# Patient Record
Sex: MALE | Race: WHITE | NOT HISPANIC OR LATINO | Employment: OTHER | URBAN - NONMETROPOLITAN AREA
[De-identification: names, ages, dates, MRNs, and addresses within clinical notes are randomized per-mention and may not be internally consistent; named-entity substitution may affect disease eponyms.]

---

## 2021-12-04 ENCOUNTER — HOSPITAL ENCOUNTER (EMERGENCY)
Facility: HOSPITAL | Age: 56
Discharge: HOME | End: 2021-12-04
Attending: EMERGENCY MEDICINE | Admitting: EMERGENCY MEDICINE

## 2021-12-04 ENCOUNTER — APPOINTMENT (OUTPATIENT)
Dept: ULTRASOUND IMAGING | Facility: HOSPITAL | Age: 56
End: 2021-12-04

## 2021-12-04 ENCOUNTER — APPOINTMENT (OUTPATIENT)
Dept: GENERAL RADIOLOGY | Facility: HOSPITAL | Age: 56
End: 2021-12-04

## 2021-12-04 VITALS
RESPIRATION RATE: 18 BRPM | HEART RATE: 69 BPM | WEIGHT: 164 LBS | DIASTOLIC BLOOD PRESSURE: 92 MMHG | BODY MASS INDEX: 27.32 KG/M2 | HEIGHT: 65 IN | SYSTOLIC BLOOD PRESSURE: 155 MMHG | OXYGEN SATURATION: 97 % | TEMPERATURE: 98.3 F

## 2021-12-04 DIAGNOSIS — M71.22 SYNOVIAL CYST OF LEFT POPLITEAL SPACE: Primary | ICD-10-CM

## 2021-12-04 LAB
ALBUMIN SERPL-MCNC: 4.24 G/DL (ref 3.5–5.2)
ALBUMIN/GLOB SERPL: 1.7 G/DL
ALP SERPL-CCNC: 75 U/L (ref 39–117)
ALT SERPL W P-5'-P-CCNC: 49 U/L (ref 1–41)
ANION GAP SERPL CALCULATED.3IONS-SCNC: 12.1 MMOL/L (ref 5–15)
AST SERPL-CCNC: 43 U/L (ref 1–40)
BASOPHILS # BLD AUTO: 0.09 10*3/MM3 (ref 0–0.2)
BASOPHILS NFR BLD AUTO: 1.2 % (ref 0–1.5)
BILIRUB SERPL-MCNC: 0.6 MG/DL (ref 0–1.2)
BUN SERPL-MCNC: 17 MG/DL (ref 6–20)
BUN/CREAT SERPL: 12.5 (ref 7–25)
CALCIUM SPEC-SCNC: 9.3 MG/DL (ref 8.6–10.5)
CHLORIDE SERPL-SCNC: 103 MMOL/L (ref 98–107)
CO2 SERPL-SCNC: 26.9 MMOL/L (ref 22–29)
CREAT SERPL-MCNC: 1.36 MG/DL (ref 0.76–1.27)
CRP SERPL-MCNC: 0.3 MG/DL (ref 0–0.5)
DEPRECATED RDW RBC AUTO: 43.8 FL (ref 37–54)
EOSINOPHIL # BLD AUTO: 0.3 10*3/MM3 (ref 0–0.4)
EOSINOPHIL NFR BLD AUTO: 4.1 % (ref 0.3–6.2)
ERYTHROCYTE [DISTWIDTH] IN BLOOD BY AUTOMATED COUNT: 13.3 % (ref 12.3–15.4)
ERYTHROCYTE [SEDIMENTATION RATE] IN BLOOD: 3 MM/HR (ref 0–20)
GFR SERPL CREATININE-BSD FRML MDRD: 55 ML/MIN/1.73
GLOBULIN UR ELPH-MCNC: 2.6 GM/DL
GLUCOSE SERPL-MCNC: 138 MG/DL (ref 65–99)
HCT VFR BLD AUTO: 44.4 % (ref 37.5–51)
HGB BLD-MCNC: 14.7 G/DL (ref 13–17.7)
IMM GRANULOCYTES # BLD AUTO: 0.02 10*3/MM3 (ref 0–0.05)
IMM GRANULOCYTES NFR BLD AUTO: 0.3 % (ref 0–0.5)
LYMPHOCYTES # BLD AUTO: 2.51 10*3/MM3 (ref 0.7–3.1)
LYMPHOCYTES NFR BLD AUTO: 34.3 % (ref 19.6–45.3)
MCH RBC QN AUTO: 29.8 PG (ref 26.6–33)
MCHC RBC AUTO-ENTMCNC: 33.1 G/DL (ref 31.5–35.7)
MCV RBC AUTO: 90.1 FL (ref 79–97)
MONOCYTES # BLD AUTO: 0.6 10*3/MM3 (ref 0.1–0.9)
MONOCYTES NFR BLD AUTO: 8.2 % (ref 5–12)
NEUTROPHILS NFR BLD AUTO: 3.8 10*3/MM3 (ref 1.7–7)
NEUTROPHILS NFR BLD AUTO: 51.9 % (ref 42.7–76)
NRBC BLD AUTO-RTO: 0 /100 WBC (ref 0–0.2)
PLATELET # BLD AUTO: 232 10*3/MM3 (ref 140–450)
PMV BLD AUTO: 10.7 FL (ref 6–12)
POTASSIUM SERPL-SCNC: 3.6 MMOL/L (ref 3.5–5.2)
PROT SERPL-MCNC: 6.8 G/DL (ref 6–8.5)
RBC # BLD AUTO: 4.93 10*6/MM3 (ref 4.14–5.8)
SODIUM SERPL-SCNC: 142 MMOL/L (ref 136–145)
WBC NRBC COR # BLD: 7.32 10*3/MM3 (ref 3.4–10.8)

## 2021-12-04 PROCEDURE — 80053 COMPREHEN METABOLIC PANEL: CPT | Performed by: PHYSICIAN ASSISTANT

## 2021-12-04 PROCEDURE — 93971 EXTREMITY STUDY: CPT

## 2021-12-04 PROCEDURE — 86140 C-REACTIVE PROTEIN: CPT | Performed by: PHYSICIAN ASSISTANT

## 2021-12-04 PROCEDURE — 85652 RBC SED RATE AUTOMATED: CPT | Performed by: PHYSICIAN ASSISTANT

## 2021-12-04 PROCEDURE — 73562 X-RAY EXAM OF KNEE 3: CPT

## 2021-12-04 PROCEDURE — 99283 EMERGENCY DEPT VISIT LOW MDM: CPT

## 2021-12-04 PROCEDURE — 85025 COMPLETE CBC W/AUTO DIFF WBC: CPT | Performed by: PHYSICIAN ASSISTANT

## 2021-12-04 NOTE — ED NOTES
MEDICAL SCREENING:    Reason for Visit: left leg swelling and bruising      Patient initially seen in triage.  The patient was advised further evaluation and diagnostic testing will be needed, some of the treatment and testing will be initiated in the lobby in order to begin the process.  The patient will be returned to the waiting area for the time being and possibly be re-assessed by a subsequent ED provider.  The patient will be brought back to the treatment area in as timely manner as possible.         lEen Luna PA-C  12/04/21 [id number]

## 2021-12-05 NOTE — ED PROVIDER NOTES
"Subjective   55 yo male patient presents to the ED with complaints of left knee and lower leg pain since Tuesday (5 days). Patient states that a \"knot\" came up on his knee while coming home from [place] on Tuesday from a fishing trip. Pt states that he developed some pain and felt like the \"knot popped.\" He denies any injury. Patient states that he went to [facility] and was told that he had an injury. Patient states that he does not recall an injury. Patient states that he is still having some pain. He also reports bruising down his left lower leg. No swelling.  Patient states he is taking ibuprofen for pain.        History provided by:  Patient   used: No        Review of Systems   Constitutional: Negative.    HENT: Negative.    Eyes: Negative.    Respiratory: Negative.    Cardiovascular: Negative.    Gastrointestinal: Negative.    Endocrine: Negative.    Genitourinary: Negative.    Musculoskeletal:        Left knee pain     Skin: Negative.    Allergic/Immunologic: Negative.    Neurological: Negative.    Hematological: Negative.    Psychiatric/Behavioral: Negative.    All other systems reviewed and are negative.      No past medical history on file.    No Known Allergies    No past surgical history on file.    No family history on file.    Social History     Socioeconomic History   • Marital status:            Objective   Physical Exam  Vitals and nursing note reviewed.   Constitutional:       Appearance: Normal appearance. He is normal weight.   HENT:      Head: Normocephalic and atraumatic.      Right Ear: External ear normal.      Left Ear: External ear normal.      Nose: Nose normal.      Mouth/Throat:      Mouth: Mucous membranes are moist.      Pharynx: Oropharynx is clear.   Eyes:      Extraocular Movements: Extraocular movements intact.      Conjunctiva/sclera: Conjunctivae normal.      Pupils: Pupils are equal, round, and reactive to light.   Cardiovascular:      Rate and " Rhythm: Normal rate and regular rhythm.      Pulses: Normal pulses.      Heart sounds: Normal heart sounds.   Pulmonary:      Effort: Pulmonary effort is normal.      Breath sounds: Normal breath sounds.   Abdominal:      General: Abdomen is flat. Bowel sounds are normal.      Palpations: Abdomen is soft.   Musculoskeletal:         General: Normal range of motion.      Cervical back: Normal range of motion and neck supple.        Legs:    Skin:     General: Skin is warm.      Capillary Refill: Capillary refill takes less than 2 seconds.   Neurological:      General: No focal deficit present.      Mental Status: He is alert and oriented to person, place, and time. Mental status is at baseline.   Psychiatric:         Mood and Affect: Mood normal.         Behavior: Behavior normal.         Thought Content: Thought content normal.         Judgment: Judgment normal.         Procedures           ED Course  ED Course as of 12/04/21 2218   Sat Dec 04, 2021   1909 US Venous Doppler Lower Extremity Left (duplex)  IMPRESSION:  No deep venous thrombus in the left lower extremity.      Signer Name: Hubert Hewitt MD   Signed: 12/4/2021 6:27 PM   Workstation Name: [facility]    [facility]          Specimen Collected: 12/04/21 18:27         [ML]   2008 XR Knee 3 View Left  IMPRESSION:  There is a faintly calcified lobular density posterior to the knee joint possibly representing calcification in a popliteal cyst measuring 2 x 2.5 cm. Also noted is mild degenerative change in the medial compartment.     Signer Name: Hubert Hewitt MD   Signed: 12/4/2021 7:53 PM   [facility]          Specimen Collected: 12/04/21 19:53 Last Resulted: 12/04/21 19:53           [ML]   2030 Pt instructed to f/u with PCP and orthopedic. Discussed sx that would warrant return to the ED.   [ML]      ED Course User Index  [ML] Cecilia Banegas PA                                                  MDM  Number of Diagnoses or Management Options     Amount and/or Complexity of Data Reviewed  Clinical lab tests: ordered and reviewed  Tests in the radiology section of CPT®: ordered and reviewed        Final diagnoses:   Synovial cyst of left popliteal space       ED Disposition  ED Disposition     ED Disposition Condition Comment    Discharge Stable           Jalil Awan MD  [address] [place] [address] [address] [place] [address]  [phone]    Schedule an appointment as soon as possible for a visit in 1 day           Medication List      No changes were made to your prescriptions during this visit.          Cecilia Banegas PA  12/04/21 2213       Cecilia Banegas PA  12/04/21 2216

## 2022-02-21 ENCOUNTER — OFFICE VISIT (OUTPATIENT)
Dept: ENDOCRINOLOGY | Facility: CLINIC | Age: 57
End: 2022-02-21

## 2022-02-21 ENCOUNTER — LAB (OUTPATIENT)
Dept: LAB | Facility: HOSPITAL | Age: 57
End: 2022-02-21

## 2022-02-21 VITALS
WEIGHT: 166 LBS | HEIGHT: 64 IN | BODY MASS INDEX: 28.34 KG/M2 | DIASTOLIC BLOOD PRESSURE: 85 MMHG | HEART RATE: 60 BPM | TEMPERATURE: 97.1 F | SYSTOLIC BLOOD PRESSURE: 122 MMHG | OXYGEN SATURATION: 95 %

## 2022-02-21 DIAGNOSIS — E04.2 MULTIPLE THYROID NODULES: ICD-10-CM

## 2022-02-21 DIAGNOSIS — R94.6 ABNORMAL THYROID FUNCTION TEST: Primary | ICD-10-CM

## 2022-02-21 PROCEDURE — 36415 COLL VENOUS BLD VENIPUNCTURE: CPT | Performed by: NURSE PRACTITIONER

## 2022-02-21 PROCEDURE — 99203 OFFICE O/P NEW LOW 30 MIN: CPT | Performed by: NURSE PRACTITIONER

## 2022-02-21 PROCEDURE — 84439 ASSAY OF FREE THYROXINE: CPT | Performed by: NURSE PRACTITIONER

## 2022-02-21 PROCEDURE — 84480 ASSAY TRIIODOTHYRONINE (T3): CPT | Performed by: NURSE PRACTITIONER

## 2022-02-21 PROCEDURE — 84481 FREE ASSAY (FT-3): CPT | Performed by: NURSE PRACTITIONER

## 2022-02-21 PROCEDURE — 84443 ASSAY THYROID STIM HORMONE: CPT | Performed by: NURSE PRACTITIONER

## 2022-02-21 NOTE — PROGRESS NOTES
Chief Complaint   Patient presents with   • Thyroid Problem     initial encounter        Referring Provider  Johana Leonard APRN HPI   Ken Orosco is a 56 y.o. male had concerns including Thyroid Problem (initial encounter).   He has had some increased SOA, has had some difficulty swallowing food and has his feet stay cold all the time.  He went to his PCP and they did some labs.  He also had an US Thyroid.  Labs:  8/13/2021:   TSH: 1.91 (0.27-4.20)  T4: 13.00 H (4.50-11.70)  Free T4: 1.27 (0.86-1.76)  T3: 3.15 H (0.80-2.00)  Free T3: 5.56 H (2.30-4.40)  12/16/2021:  TSH: 1.94 (0.27-4.20)  T4: 11.60 (4.50-11.70)  Free T4: 1.15 (0.86-1.76)  T3: 2.80 H (0.80-2.00)  Free T3: 5.25 H (2.30-4.40)  US Thyroid 09/20/2021:  Right lobe with 2-8mm TI-RADS 4 nodules.    Birth state: [place]  Previous history of radiation to face/neck: none  Consuming foods high in iodine: none  Family history of thyroid complications: sister-Graves' disease.    The following portions of the patient's history were reviewed and updated as appropriate: allergies, current medications, past family history, past medical history, past social history, past surgical history and problem list.    History reviewed. No pertinent past medical history.  Past Surgical History:   Procedure Laterality Date   • APPENDECTOMY  2002      Family History   Problem Relation Age of Onset   • ALS Mother    • Graves' disease Sister       Social History     Socioeconomic History   • Marital status:    Tobacco Use   • Smoking status: Never Smoker   • Smokeless tobacco: Never Used   Vaping Use   • Vaping Use: Never used   Substance and Sexual Activity   • Alcohol use: Never   • Drug use: Never   • Sexual activity: Defer      No Known Allergies   No current outpatient medications on file prior to visit.     No current facility-administered medications on file prior to visit.     Review of Systems   Constitutional: Positive for fatigue. Negative for appetite change,  "unexpected weight gain and unexpected weight loss.   Eyes: Positive for blurred vision.   Respiratory: Positive for shortness of breath.    Cardiovascular: Positive for palpitations.   Gastrointestinal: Negative.    Endocrine: Positive for cold intolerance, polydipsia, polyphagia and polyuria. Negative for heat intolerance.   Skin: Positive for dry skin.   Neurological: Negative for tremors.   Psychiatric/Behavioral: Positive for agitation. Negative for sleep disturbance. The patient is nervous/anxious.       /85 (BP Location: Left arm, Patient Position: Sitting, Cuff Size: Adult)   Pulse 60   Temp 97.1 °F (36.2 °C) (Oral)   Ht 162.6 cm (64\")   Wt 75.3 kg (166 lb)   SpO2 95%   BMI 28.49 kg/m²      Physical Exam  Vitals reviewed.   Constitutional:       Appearance: Normal appearance.   Eyes:      Extraocular Movements: Extraocular movements intact.   Neck:      Thyroid: No thyroid mass, thyromegaly or thyroid tenderness.      Comments: No palpable nodules  Cardiovascular:      Rate and Rhythm: Normal rate and regular rhythm.      Pulses: Normal pulses.      Heart sounds: Normal heart sounds.   Pulmonary:      Effort: Pulmonary effort is normal.      Breath sounds: Normal breath sounds.   Skin:     General: Skin is warm.   Neurological:      Mental Status: He is alert and oriented to person, place, and time.   Psychiatric:         Mood and Affect: Mood normal.         Behavior: Behavior normal.         Thought Content: Thought content normal.         Judgment: Judgment normal.       Labs/Imaging  CMP  Lab Results   Component Value Date    GLUCOSE 138 (H) 12/04/2021    BUN 17 12/04/2021    CREATININE 1.36 (H) 12/04/2021    EGFRIFNONA 55 (L) 12/04/2021    BCR 12.5 12/04/2021    K 3.6 12/04/2021    CO2 26.9 12/04/2021    CALCIUM 9.3 12/04/2021    ALBUMIN 4.24 12/04/2021    AST 43 (H) 12/04/2021    ALT 49 (H) 12/04/2021        CBC w/DIFF   Lab Results   Component Value Date    WBC 7.32 12/04/2021    RBC 4.93 " 12/04/2021    HGB 14.7 12/04/2021    HCT 44.4 12/04/2021    MCV 90.1 12/04/2021    MCH 29.8 12/04/2021    MCHC 33.1 12/04/2021    RDW 13.3 12/04/2021    RDWSD 43.8 12/04/2021    MPV 10.7 12/04/2021     12/04/2021    NEUTRORELPCT 51.9 12/04/2021    LYMPHORELPCT 34.3 12/04/2021    MONORELPCT 8.2 12/04/2021    EOSRELPCT 4.1 12/04/2021    BASORELPCT 1.2 12/04/2021    AUTOIGPER 0.3 12/04/2021    NEUTROABS 3.80 12/04/2021    LYMPHSABS 2.51 12/04/2021    MONOSABS 0.60 12/04/2021    EOSABS 0.30 12/04/2021    BASOSABS 0.09 12/04/2021    AUTOIGNUM 0.02 12/04/2021    NRBC 0.0 12/04/2021       TSH  No results found for: TSH    T4  No results found for: FREET4  No results found for: H4TOHEX    T3  No results found for: T3FREE  No results found for: T4KGHCP    TRAb  No results found for: TSURCPAB    TPO  No results found for: THYROIDAB    No valid procedures specified.    Assessment and Plan    Diagnoses and all orders for this visit:    1. Abnormal thyroid function test (Primary)  Assessment & Plan:  -Clinically euthyroid.  -Discussed with patient and wife the labs.  He needs further labs to help determine thyroid issue.  Labs to be done today.  -Follow-up in 3 months.    Orders:  -     TSH  -     T4, Free  -     T3, Free  -     T3  -     T4    2. Multiple thyroid nodules  Assessment & Plan:  -US thyroid to be repeated in March 2022, as this will be 6 months after most recent report.    Orders:  -     US Thyroid; Future       Return in about 3 months (around 5/21/2022) for Follow-up appointment. The patient was instructed to contact the clinic with any interval questions or concerns.    MANAS Jorgensen   Endocrinology

## 2022-02-21 NOTE — ASSESSMENT & PLAN NOTE
-Clinically euthyroid.  -Discussed with patient and wife the labs.  He needs further labs to help determine thyroid issue.  Labs to be done today.  -Follow-up in 3 months.

## 2022-02-22 ENCOUNTER — TELEPHONE (OUTPATIENT)
Dept: ENDOCRINOLOGY | Facility: CLINIC | Age: 57
End: 2022-02-22

## 2022-02-22 LAB
T3 SERPL-MCNC: 248 NG/DL (ref 80–200)
T3FREE SERPL-MCNC: 4.41 PG/ML (ref 2–4.4)
T4 FREE SERPL-MCNC: 1.03 NG/DL (ref 0.93–1.7)
T4 SERPL-MCNC: 11.2 MCG/DL (ref 4.5–11.7)
TSH SERPL DL<=0.05 MIU/L-ACNC: 1.62 UIU/ML (ref 0.27–4.2)

## 2022-02-22 NOTE — TELEPHONE ENCOUNTER
I called patient per MANAS Jorgensen to inform him that his thyroid labs are still abnormal, although they are improving. Shaila wants him to get labs drawn again at next office visit so that we can continue to monitor his thyroid levels. Patient understood and was appreciative of my call.

## 2022-02-23 ENCOUNTER — PATIENT ROUNDING (BHMG ONLY) (OUTPATIENT)
Dept: ENDOCRINOLOGY | Facility: CLINIC | Age: 57
End: 2022-02-23

## 2022-02-23 NOTE — PROGRESS NOTES
A [facility] message has been sent to the patient for patient [place] [facility] [place]

## 2022-03-16 ENCOUNTER — APPOINTMENT (OUTPATIENT)
Dept: ULTRASOUND IMAGING | Facility: HOSPITAL | Age: 57
End: 2022-03-16

## 2025-01-29 ENCOUNTER — APPOINTMENT (OUTPATIENT)
Dept: MRI IMAGING | Facility: HOSPITAL | Age: 60
End: 2025-01-29
Payer: COMMERCIAL

## 2025-01-29 ENCOUNTER — APPOINTMENT (OUTPATIENT)
Dept: CT IMAGING | Facility: HOSPITAL | Age: 60
End: 2025-01-29
Payer: COMMERCIAL

## 2025-01-29 ENCOUNTER — HOSPITAL ENCOUNTER (EMERGENCY)
Facility: HOSPITAL | Age: 60
Discharge: HOME | End: 2025-01-29
Attending: STUDENT IN AN ORGANIZED HEALTH CARE EDUCATION/TRAINING PROGRAM | Admitting: STUDENT IN AN ORGANIZED HEALTH CARE EDUCATION/TRAINING PROGRAM
Payer: COMMERCIAL

## 2025-01-29 ENCOUNTER — APPOINTMENT (OUTPATIENT)
Dept: GENERAL RADIOLOGY | Facility: HOSPITAL | Age: 60
End: 2025-01-29
Payer: COMMERCIAL

## 2025-01-29 VITALS
BODY MASS INDEX: 26.49 KG/M2 | RESPIRATION RATE: 20 BRPM | WEIGHT: 159 LBS | DIASTOLIC BLOOD PRESSURE: 100 MMHG | OXYGEN SATURATION: 98 % | TEMPERATURE: 98 F | SYSTOLIC BLOOD PRESSURE: 145 MMHG | HEART RATE: 76 BPM | HEIGHT: 65 IN

## 2025-01-29 DIAGNOSIS — R07.9 CHEST PAIN, UNSPECIFIED TYPE: Primary | ICD-10-CM

## 2025-01-29 DIAGNOSIS — R53.1 RIGHT SIDED WEAKNESS: ICD-10-CM

## 2025-01-29 LAB
ABO GROUP BLD: NORMAL
ABO GROUP BLD: NORMAL
ALBUMIN SERPL-MCNC: 4.6 G/DL (ref 3.5–5.2)
ALBUMIN/GLOB SERPL: 1.4 G/DL
ALP SERPL-CCNC: 73 U/L (ref 39–117)
ALT SERPL W P-5'-P-CCNC: 36 U/L (ref 1–41)
ANION GAP SERPL CALCULATED.3IONS-SCNC: 17.1 MMOL/L (ref 5–15)
AST SERPL-CCNC: 34 U/L (ref 1–40)
BASOPHILS # BLD AUTO: 0.11 10*3/MM3 (ref 0–0.2)
BASOPHILS NFR BLD AUTO: 1 % (ref 0–1.5)
BILIRUB SERPL-MCNC: 0.7 MG/DL (ref 0–1.2)
BLD GP AB SCN SERPL QL: NEGATIVE
BUN SERPL-MCNC: 23 MG/DL (ref 6–20)
BUN/CREAT SERPL: 23 (ref 7–25)
CALCIUM SPEC-SCNC: 9.7 MG/DL (ref 8.6–10.5)
CHLORIDE SERPL-SCNC: 102 MMOL/L (ref 98–107)
CO2 SERPL-SCNC: 20.9 MMOL/L (ref 22–29)
CREAT BLDA-MCNC: 1.2 MG/DL (ref 0.6–1.3)
CREAT SERPL-MCNC: 1 MG/DL (ref 0.76–1.27)
DEPRECATED RDW RBC AUTO: 44.1 FL (ref 37–54)
EGFRCR SERPLBLD CKD-EPI 2021: 87.8 ML/MIN/1.73
EOSINOPHIL # BLD AUTO: 0.24 10*3/MM3 (ref 0–0.4)
EOSINOPHIL NFR BLD AUTO: 2.3 % (ref 0.3–6.2)
ERYTHROCYTE [DISTWIDTH] IN BLOOD BY AUTOMATED COUNT: 13.5 % (ref 12.3–15.4)
GEN 5 1HR TROPONIN T REFLEX: 8 NG/L
GLOBULIN UR ELPH-MCNC: 3.2 GM/DL
GLUCOSE BLDC GLUCOMTR-MCNC: 132 MG/DL (ref 70–130)
GLUCOSE SERPL-MCNC: 179 MG/DL (ref 65–99)
HCT VFR BLD AUTO: 49 % (ref 37.5–51)
HGB BLD-MCNC: 16.2 G/DL (ref 13–17.7)
HOLD SPECIMEN: NORMAL
HOLD SPECIMEN: NORMAL
IMM GRANULOCYTES # BLD AUTO: 0.03 10*3/MM3 (ref 0–0.05)
IMM GRANULOCYTES NFR BLD AUTO: 0.3 % (ref 0–0.5)
INR PPP: 0.96 (ref 0.9–1.1)
LYMPHOCYTES # BLD AUTO: 5.22 10*3/MM3 (ref 0.7–3.1)
LYMPHOCYTES NFR BLD AUTO: 49 % (ref 19.6–45.3)
MCH RBC QN AUTO: 29.3 PG (ref 26.6–33)
MCHC RBC AUTO-ENTMCNC: 33.1 G/DL (ref 31.5–35.7)
MCV RBC AUTO: 88.6 FL (ref 79–97)
MONOCYTES # BLD AUTO: 0.69 10*3/MM3 (ref 0.1–0.9)
MONOCYTES NFR BLD AUTO: 6.5 % (ref 5–12)
NEUTROPHILS NFR BLD AUTO: 4.37 10*3/MM3 (ref 1.7–7)
NEUTROPHILS NFR BLD AUTO: 40.9 % (ref 42.7–76)
NRBC BLD AUTO-RTO: 0 /100 WBC (ref 0–0.2)
PLATELET # BLD AUTO: 266 10*3/MM3 (ref 140–450)
PMV BLD AUTO: 10.5 FL (ref 6–12)
POTASSIUM SERPL-SCNC: 3.5 MMOL/L (ref 3.5–5.2)
PROT SERPL-MCNC: 7.8 G/DL (ref 6–8.5)
PROTHROMBIN TIME: 12.9 SECONDS (ref 11.6–15.1)
RBC # BLD AUTO: 5.53 10*6/MM3 (ref 4.14–5.8)
RH BLD: POSITIVE
RH BLD: POSITIVE
SODIUM SERPL-SCNC: 140 MMOL/L (ref 136–145)
T&S EXPIRATION DATE: NORMAL
TROPONIN T NUMERIC DELTA: 0 NG/L
TROPONIN T SERPL HS-MCNC: 8 NG/L
WBC NRBC COR # BLD AUTO: 10.66 10*3/MM3 (ref 3.4–10.8)
WHOLE BLOOD HOLD COAG: NORMAL
WHOLE BLOOD HOLD SPECIMEN: NORMAL

## 2025-01-29 PROCEDURE — 25510000001 IOPAMIDOL PER 1 ML: Performed by: STUDENT IN AN ORGANIZED HEALTH CARE EDUCATION/TRAINING PROGRAM

## 2025-01-29 PROCEDURE — 93005 ELECTROCARDIOGRAM TRACING: CPT | Performed by: STUDENT IN AN ORGANIZED HEALTH CARE EDUCATION/TRAINING PROGRAM

## 2025-01-29 PROCEDURE — 99285 EMERGENCY DEPT VISIT HI MDM: CPT

## 2025-01-29 PROCEDURE — 86850 RBC ANTIBODY SCREEN: CPT | Performed by: STUDENT IN AN ORGANIZED HEALTH CARE EDUCATION/TRAINING PROGRAM

## 2025-01-29 PROCEDURE — 71045 X-RAY EXAM CHEST 1 VIEW: CPT | Performed by: RADIOLOGY

## 2025-01-29 PROCEDURE — 70498 CT ANGIOGRAPHY NECK: CPT

## 2025-01-29 PROCEDURE — 80053 COMPREHEN METABOLIC PANEL: CPT | Performed by: STUDENT IN AN ORGANIZED HEALTH CARE EDUCATION/TRAINING PROGRAM

## 2025-01-29 PROCEDURE — 86901 BLOOD TYPING SEROLOGIC RH(D): CPT

## 2025-01-29 PROCEDURE — 85610 PROTHROMBIN TIME: CPT | Performed by: STUDENT IN AN ORGANIZED HEALTH CARE EDUCATION/TRAINING PROGRAM

## 2025-01-29 PROCEDURE — 36415 COLL VENOUS BLD VENIPUNCTURE: CPT | Performed by: STUDENT IN AN ORGANIZED HEALTH CARE EDUCATION/TRAINING PROGRAM

## 2025-01-29 PROCEDURE — 86900 BLOOD TYPING SEROLOGIC ABO: CPT

## 2025-01-29 PROCEDURE — 70553 MRI BRAIN STEM W/O & W/DYE: CPT

## 2025-01-29 PROCEDURE — 70498 CT ANGIOGRAPHY NECK: CPT | Performed by: RADIOLOGY

## 2025-01-29 PROCEDURE — 86901 BLOOD TYPING SEROLOGIC RH(D): CPT | Performed by: STUDENT IN AN ORGANIZED HEALTH CARE EDUCATION/TRAINING PROGRAM

## 2025-01-29 PROCEDURE — 70496 CT ANGIOGRAPHY HEAD: CPT

## 2025-01-29 PROCEDURE — 25510000002 GADOBENATE DIMEGLUMINE 529 MG/ML SOLUTION: Performed by: STUDENT IN AN ORGANIZED HEALTH CARE EDUCATION/TRAINING PROGRAM

## 2025-01-29 PROCEDURE — 70450 CT HEAD/BRAIN W/O DYE: CPT

## 2025-01-29 PROCEDURE — 93010 ELECTROCARDIOGRAM REPORT: CPT | Performed by: INTERNAL MEDICINE

## 2025-01-29 PROCEDURE — A9577 INJ MULTIHANCE: HCPCS | Performed by: STUDENT IN AN ORGANIZED HEALTH CARE EDUCATION/TRAINING PROGRAM

## 2025-01-29 PROCEDURE — 84484 ASSAY OF TROPONIN QUANT: CPT | Performed by: STUDENT IN AN ORGANIZED HEALTH CARE EDUCATION/TRAINING PROGRAM

## 2025-01-29 PROCEDURE — 82948 REAGENT STRIP/BLOOD GLUCOSE: CPT

## 2025-01-29 PROCEDURE — 71045 X-RAY EXAM CHEST 1 VIEW: CPT

## 2025-01-29 PROCEDURE — 82565 ASSAY OF CREATININE: CPT

## 2025-01-29 PROCEDURE — 99204 OFFICE O/P NEW MOD 45 MIN: CPT | Performed by: PSYCHIATRY & NEUROLOGY

## 2025-01-29 PROCEDURE — 70496 CT ANGIOGRAPHY HEAD: CPT | Performed by: RADIOLOGY

## 2025-01-29 PROCEDURE — 85025 COMPLETE CBC W/AUTO DIFF WBC: CPT | Performed by: STUDENT IN AN ORGANIZED HEALTH CARE EDUCATION/TRAINING PROGRAM

## 2025-01-29 PROCEDURE — 70553 MRI BRAIN STEM W/O & W/DYE: CPT | Performed by: RADIOLOGY

## 2025-01-29 PROCEDURE — 86900 BLOOD TYPING SEROLOGIC ABO: CPT | Performed by: STUDENT IN AN ORGANIZED HEALTH CARE EDUCATION/TRAINING PROGRAM

## 2025-01-29 RX ORDER — IOPAMIDOL 755 MG/ML
100 INJECTION, SOLUTION INTRAVASCULAR
Status: COMPLETED | OUTPATIENT
Start: 2025-01-29 | End: 2025-01-29

## 2025-01-29 RX ORDER — SODIUM CHLORIDE 0.9 % (FLUSH) 0.9 %
10 SYRINGE (ML) INJECTION AS NEEDED
Status: DISCONTINUED | OUTPATIENT
Start: 2025-01-29 | End: 2025-01-30 | Stop reason: HOSPADM

## 2025-01-29 RX ORDER — ASPIRIN 81 MG/1
324 TABLET, CHEWABLE ORAL ONCE
Status: COMPLETED | OUTPATIENT
Start: 2025-01-29 | End: 2025-01-29

## 2025-01-29 RX ORDER — CLOPIDOGREL BISULFATE 75 MG/1
75 TABLET ORAL DAILY
Qty: 30 TABLET | Refills: 0 | Status: SHIPPED | OUTPATIENT
Start: 2025-01-29

## 2025-01-29 RX ORDER — ASPIRIN 81 MG/1
81 TABLET ORAL DAILY
Qty: 30 TABLET | Refills: 0 | Status: SHIPPED | OUTPATIENT
Start: 2025-01-29 | End: 2025-02-28

## 2025-01-29 RX ADMIN — IOPAMIDOL 100 ML: 755 INJECTION, SOLUTION INTRAVENOUS at 17:19

## 2025-01-29 RX ADMIN — GADOBENATE DIMEGLUMINE 14 ML: 529 INJECTION, SOLUTION INTRAVENOUS at 21:01

## 2025-01-29 RX ADMIN — ASPIRIN 324 MG: 81 TABLET, CHEWABLE ORAL at 17:31

## 2025-01-29 NOTE — CONSULTS
DOS: 2025  NAME: Ken Orosco   : 1965  PCP: Johana Leonard APRN  CC: Left facial droop which is corrected but now has left upper and lower extremity weakness that started approximately 3 days ago  Referring MD: Jose Alfredo Vincent MD    Neurological Problem and Interval History:  59 y.o. right-handed white male with a Hx of recent left-sided facial droop for which she was seen in the emergency room at [facility] in [place] on 2025 and had an MRI done which was unremarkable had come to our emergency room with chest pain and was noted to have some weakness of the left upper and lower extremities.  Patient states that these occurred at the same time as the left side of the face but then it has improved but still having some problems with balance and tends to drag his left leg when walking around.  He has no facial asymmetry or speech impediment or visual impairment and he already passed his bedside swallow and got a dose of aspirin in the emergency room.  There is no numbness or extinction of sensations on double simultaneous presentation of noxious stimulation bilaterally on the face or the upper and lower extremities.  There is a mild drift noticeable in the left upper and lower extremities compared to the right.  The finger-to-nose coordination on the right side is normal and the heel-to-shin testing on the right side is normal.  The left side there is a delay because of the mild weakness noticeable.  He was able to get out of the bed independently and when Romberg's was attempted he was feeling off balance and falling to the right side.    Past Medical/Surgical Hx:  No past medical history on file.  Past Surgical History:   Procedure Laterality Date    APPENDECTOMY         Review of Systems:    Constitutional: Pleasant gentleman currently laying in the gurney in no distress  Cardiovascular: Was initially seen in the emergency room with chest pain and palpitations  history.  Respiratory: No shortness of breath noted.  Gastrointestinal: Nausea and vomiting noted.  Genitourinary: No bladder incontinence noted.  Musculoskeletal: Has mild weakness of the left upper and lower extremities.  Dermatological: No skin breakdown noted.  Neurological: The NIH stroke scale is 2  Psychiatric: No major anxiety or depression noted.  Ophthalmological: No vision changes noted.          Medications On Admission  (Not in a hospital admission)      Prior to Admission medications    Medication Sig Start Date End Date Taking? Authorizing Provider   lisinopril (PRINIVIL,ZESTRIL) 5 MG tablet Take 1 tablet by mouth Daily for 30 days. 7/17/23 8/16/23  Arya Orozco MD        Allergies:  No Known Allergies    Social Hx:  Social History     Socioeconomic History    Marital status:    Tobacco Use    Smoking status: Never    Smokeless tobacco: Never   Vaping Use    Vaping status: Never Used   Substance and Sexual Activity    Alcohol use: Never    Drug use: Never    Sexual activity: Defer       Family Hx:  Family History   Problem Relation Age of Onset    ALS Mother     Graves' disease Sister        Review of Imaging (Interpretation of images not reports): MRI OF THE BRAIN.  This was performed on January 26, 2025 at [facility] in [facility]    HISTORY: Left facial droop.     PROCEDURE: Multiplanar MR imaging of the brain was performed in multiple   MR sequences.     FINDINGS: Limited images the proximal cord are unremarkable. The   cervical medullary junction is normal in appearance. The posterior fossa   and brainstem are unremarkable. There are patent major vascular flow   voids noted. No evidence of restricted diffusion or acute ischemia is   seen. There is mild to moderate atrophy present, scattered through the   cortices. There is increased T2 signal in the deep periventricular white   matter, that likely represents small vessel chronic ischemic changes. No   evidence of edema,  hemorrhage, or hemosiderin deposition is seen.     Impression    Changes of mild to moderate atrophy, with chronic ischemic  changes as described.    No acute intracranial abnormality is identified.     CT Head Without Contrast Stroke Protocol    Result Date: 1/29/2025  VERIFICATION OBSERVER NAME: Gilbert Jones MD.  Technique: Axial images were obtained along with coronal and sagittal reconstruction. DLP in mGycm reported in the EMR records. Dose lowering technique: Automated exposure control with adjustment of the MA and/or KV, use of iterative reconstruction. Data included in the medical records.  HISTORY/COMPARISON/FINDINGS:  Comparison: None.  There is no acute territorial infarct, hemorrhage or abnormal mass effect. There is no intracranial mass identified. Moderate atrophy and mild small vessel disease.  No shift of the midline structures.  No depressed skull fracture.  No extra-axial fluid collection.  Posterior fossa structures appeared grossly unremarkable.      Impression: No acute intracranial process. Chronic changes of atrophy and small vessel disease.  VERIFICATION OBSERVER NAME: Gilbert Jones MD.  Comparison: Same day CT of brain.  TECHNIQUE: A CT angiogram of the neck and intracranial circulation was performed following the administration of contrast. Coronal and sagittal reformatted images were reviewed at the workstation. 3D and MIP images were obtained on a dedicated workstation. Evaluation for stenosis was performed utilizing NASCET criteria and in relation to distal vessel diameter. DLP in mGy-cm and contrast amount and type are reported in the EMR records. Dose lowering technique: Automated exposure control. Data included in the medical records.  HISTORY/COMPARISON/FINDINGS:  Other findings: [None relevant]  Extracranial circulation:  Patent bilateral common carotid arteries. Minimal plaque LEFT bulb area.  No stenosis. Patent bilateral ICAs.  Patent bilateral equal sized vertebral arteries..   Intracranial circulation:  Distal vertebral arteries, basilar artery and posterior circulation appeared unremarkable. Patent intracranial ICAs. Patent ACAs. Minimal atherosclerotic changes of the M1 segment of the LEFT MCA. Otherwise MCAs are normal.  No aneurysm.  IMPRESSION:  No occlusion or significant stenosis..   [id number] Code 46204.            This report was finalized on 1/29/2025 5:36 PM by Dr. Gilbert Jones MD.      CT Head Without Contrast    Result Date: 1/25/2025  PERFUSION HEAD HISTORY: Focal neurologic deficit. COMPARISON: CT head from the same day. TECHNIQUE: Multiple axial CT sections were performed from the foramen magnum to the vertex pre and post contrast injection. The post contrast images were performed utilizing 2 separate boluses and extensive postprocessing was performed to provide maps of regional blood flow and volume according to the perfusion protocol. FINDINGS: A repeat noncontrast examination is unremarkable. Perfusion imaging shows no abnormality of regional brain blood flow or volume.    Impression: Unremarkable. CTA HEAD HISTORY: As above TECHNIQUE: Thin-section axial CT with contrast with multiplanar reconstruction. This study was performed with techniques to keep radiation doses as low as reasonably achievable, (ALARA). Individualized  dose reduction techniques using automated exposure control or adjustment of mA and/or kV according to the patient size were employed. FINDINGS:  The cranial circulation is unremarkable. There is no significant stenosis, aneurysm, or occlusion. IMPRESSION:  No acute vascular abnormality or stenosis. CTA OF THE NECK HISTORY: As above TECHNIQUE: Thin-section axial CT with IV contrast supplemented with multiplanar reconstruction. This study was performed with techniques to keep radiation doses as low as reasonably achievable, (ALARA). Individualized dose reduction techniques using automated exposure control or adjustment of mA and/or kV according to  the patient size were employed. COMPARISON: CT head from the same day. FINDINGS: The lung apices demonstrate no acute abnormalities. The thyroid gland demonstrates no concerning findings. The visualized cervical spine demonstrates no acute abnormalities. The visualized paranasal sinuses are clear. Aortic arch: The aortic arch has a normal contour. Right carotid:  No significant stenosis is seen of the cervical common or internal carotid artery. Left carotid:  No significant stenosis is seen of the cervical common or internal carotid artery. Vertebrals: The right vertebral artery is dominant. No significant stenosis is present. IMPRESSION:  No acute vascular abnormality or stenosis. Images reviewed, interpreted, and dictated by LUIS Santizo M.D.     CT Head Without Contrast    Result Date: 1/25/2025  HEAD CT     1/25/2025 5:07 PM HISTORY: Focal neurologic deficit. COMPARISON: None. TECHNIQUE: Multiple axial CT images were performed from the foramen magnum to the vertex. Individualized dose reduction techniques using automated exposure control or adjustment of mA and/or kV according to the patient size were employed. FINDINGS: There is mild generalized cerebral atrophy and proportionate enlargement of the ventricles. There is a mild degree of hypoattenuation seen in the periventricular and subcortical white matter, which is most consistent with chronic microvascular ischemia. There is no evidence of acute edema or hemorrhage.  No masses are identified. No extra-axial fluid is seen. The paranasal sinuses are unremarkable.    Impression: Atrophy and chronic microvascular ischemia, without acute intracranial process. Images reviewed, interpreted, and dictated by LUIS Santizo M.D.             Additional Tests Performed: A CT angiogram of the neck and intracranial circulation was  performed following the administration of contrast. Coronal and sagittal  reformatted images were reviewed at the workstation. 3D and MIP  images  were obtained on a dedicated workstation. Evaluation for stenosis was  performed utilizing NASCET criteria and in relation to distal vessel  diameter. DLP in mGy-cm and contrast amount and type are reported in the  EMR records. Dose lowering technique: Automated exposure control. Data  included in the medical records.      HISTORY/COMPARISON/FINDINGS:     Other findings: [None relevant]     Extracranial circulation:     Patent bilateral common carotid arteries.  Minimal plaque LEFT bulb area.  No stenosis.  Patent bilateral ICAs.    Patent bilateral equal sized vertebral arteries..     Intracranial circulation:     Distal vertebral arteries, basilar artery and posterior circulation  appeared unremarkable.  Patent intracranial ICAs.  Patent ACAs.  Minimal atherosclerotic changes of the M1 segment of the LEFT MCA.   Otherwise MCAs are normal.  No aneurysm.     IMPRESSION:     No occlusion or significant stenosis..     Transthoracic echocardiogram performed on January 26, 2025 at [facility] in [facility] was interpreted as follows:      TTE procedure: ECHO COMPLETE (DOPPLER / COLOR) W OR WO CONTRAST.    HR: 55 bpmRhythm: Sinus Rhythm Patient Status: STAT IP    Study Location: ERTechnical Quality: Good visualization    Contrast Medium: Agitated saline. Amount - 10 ml   Indications:Facial Numbness and Weakness.    History/Tech Notes:    Stroke like symptoms    Impression:    Normal sized left ventricle.    Normal left ventricular systolic function with LVEF 60-65%    Normal diastolic function    Normal TAPSE c/w normal right ventricular function    Bubble study demonstrated R-L shunt occuring after 4 beats. Pulmonary AVM    or PFO suspected.      MR Head Without and With Intravenous Contrast     EXAM DATE:    1/29/2025 8:20 PM     CLINICAL HISTORY:    right MCA stroke/ neoplasm     TECHNIQUE:    Magnetic resonance images of the head/brain without and with  intravenous contrast in multiple  "planes.     COMPARISON:    CT, CTA same day, CT 7/17/2023     FINDINGS:    Brain and extra-axial spaces:  Generalized cerebral atrophy is noted.   Prominent sulci or extra-axial spaces in the right frontal lobe and in  the bilateral parietal regions which may be attributable to more  pronounced lobar atrophy or could indicate presence of small arachnoid  cyst.  Mild chronic small vessel ischemic disease.  No hemorrhage.  No  enhancing brain lesions.  No acute intracranial abnormality. No acute  infarct.    Bones/joints:  Unremarkable.  No acute fracture.    Sinuses:  Unremarkable as visualized.  No acute sinusitis.    Mastoid air cells:  Unremarkable as visualized.  No mastoid effusion.    Orbits:  Unremarkable as visualized.     IMPRESSION:  1.  No enhancing brain lesions.  2.  No acute intracranial abnormality. No acute infarct.  3.  Generalized cerebral atrophy is noted.  4.  Mild chronic small vessel ischemic disease.  5. Other incidental findings above.    Laboratory Results:   Lab Results   Component Value Date    GLUCOSE 179 (H) 01/29/2025    CALCIUM 9.7 01/29/2025     01/29/2025    K 3.5 01/29/2025    CO2 20.9 (L) 01/29/2025     01/29/2025    BUN 23 (H) 01/29/2025    CREATININE 1.20 01/29/2025    EGFRIFNONA 55 (L) 12/04/2021    BCR 23.0 01/29/2025    ANIONGAP 17.1 (H) 01/29/2025     Lab Results   Component Value Date    WBC 10.66 01/29/2025    HGB 16.2 01/29/2025    HCT 49.0 01/29/2025    MCV 88.6 01/29/2025     01/29/2025       Lab Results   Component Value Date    INR 0.96 01/29/2025    INR 0.98 01/25/2025    PROTIME 12.9 01/29/2025    PROTIME 10.6 01/25/2025     Physical Examination:  /94   Pulse 84   Temp 98 °F (36.7 °C) (Infrared)   Resp 20   Ht 165.1 cm (65\")   Wt 72.1 kg (159 lb)   SpO2 96%   BMI 26.46 kg/m²   General Appearance:   Well developed, well nourished, well groomed, alert, and cooperative.  HEENT: Normocephalic.    Neck and Spine: Normal range of motion.  " Normal alignment. No mass or tenderness. No bruits.    Extremities:    No edema or deformities. Normal joint ROM.   Skin:    No rashes or birth marks.    Neurological examination:  Higher Integrative  Function: Oriented to time, place and person. Normal registration, recall, attention span and concentration. Normal language including comprehension, spontaneous speech, repetition, reading, writing, naming and vocabulary. No neglect with normal visual-spatial function and construction. Normal fund of knowledge and higher integrative function.  CN II:  Pupils are equal, round, and reactive to light. Normal visual acuity and visual fields.    CN III IV VI: Extraocular movements are full without nystagmus.   CN V:  Normal facial sensation and strength of muscles of mastication.  CN VII:  Facial movements are symmetric. No weakness.  CN VIII: Auditory acuity is normal.  CN IX & X: Symmetric palatal movement.  CN XI:  Sternocleidomastoid and trapezius are normal.  No weakness.  CN XII:  The tongue is midline.  No atrophy or fasciculations.  Motor:  Normal muscle strength, bulk and tone in the right upper and lower extremities with the drift noticeable in the left upper and lower extremities.  No fasciculations, rigidity, spasticity, or abnormal movements.  Sensation: Normal to light touch, pinprick, vibration, temperature, and proprioception in arms and legs. Normal graphesthesia and no extinction on DSS.  Station and Gait: He is able to get out of the bed independently and stand up independently but the Romberg was positive falling over to the right side..    Coordination:  Finger to nose test shows no dysmetria.  Heel to shin normal.    NIHSS:    Baseline  0-->Alert: keenly responsive  0-->Answers both questions correctly  0-->Performs both tasks correctly  0=normal  0=No visual loss  0=Normal symmetric movement  1-->Drift: limb holds 90 (or 45) degrees, but drifts down before full 10 seconds: does not hit bed or other  support  0-->No drift: limb holds 90 (or 45) degrees for full 10 secs  1-->Drift: limb holds 90 (or 45) degrees, but drifts down before full 10 seconds: does not hit bed or other support  0-->No drift: limb holds 90 (or 45) degrees for full 10 secs  0=Absent  0=Normal; no sensory loss  0=No aphasia, normal  0=Normal  0=No abnormality    Total score: 2    Diagnoses / Discussion:  59 y.o. who presents with Sx of strokelike symptoms that have been described by the emergency room physician Dr. Jameel Price on January 25, 2025 at [facility] in [facility], please review his evaluation notes for all the details which is available on epic anywhere.    Plan:  Patient already passed the bedside swallow and got a dose of aspirin 325 mg.  As he is already 3 days past the acute event maintain the systolic blood pressure between 1 20-1 40 and the diastolic between 70-80.  MRI of the brain with and without contrast to look for any neoplasm versus any evolving infarct in the right MCA territory has been requested from the emergency room on an urgent basis to help develop a guideline for his treatment.  Transthoracic echocardiogram with bubble study showed evidence of PFO with right-to-left shunt and so the patient needs to be on dual antiplatelet agents including baby aspirin and Plavix..  Lab work for tomorrow should include a lipid profile, TSH, sedimentation rate, hemoglobin A1c, vitamin B12 and folic acid levels.  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  As he passed the bedside swallow he should be on a carbohydrate controlled cardiac diet.  He is borderline diabetic.  Have physical therapy/Occupational Therapy/speech pathology evaluate the patient to look for rehabilitation potential.  As the neuroimaging studies with the MRI is unremarkable he  should be able to contact his primary care physician and get some physical therapy and Occupational Therapy as an outpatient.  Also discussed with Dr. Vincent the ER physician that on 2 separate occasions 3 days apart the MRI of the brain the initial 1 without contrast and the second 1 with and without contrast have been unremarkable and chances of a stroke are extremely low in this situation.  This could be a migraine with aura or something peripheral.  I have discussed the above with the patient and family.  He will be followed up in the outpatient clinic with MANAS Carrillo     Time spent with patient: 70 minutes in face-to-face evaluation and management of the patient using the dedicated telemedicine device without any interruption with the help of the emergency room nurse with the patient located at the [facility] and myself at a remote location.    Electronically signed by Henrik Vanegas MD, 01/29/25, 6:13 PM EST.    Dictated using Dragon dictation.

## 2025-01-29 NOTE — ED PROVIDER NOTES
Subjective   History of Present Illness  Patient is a 59-year-old male past medical history significant for TIA diagnosed Sunday presents for evaluation of chest pain.  Patient endorses being discharged from hospital on Sunday secondary to TIA.  States that he was supposed to be transferred to outside hospital for continued workup but was discharged secondary to inability to find transport.  Patient states that he has a substernal midline chest pain without radiation that started today.  Endorses associated shortness of breath, nausea, presyncope.  Denies syncope.  Patient denies history of cardiac derangement.  Denying other constitutional symptoms.  Last known normal 3 PM Saturday, 1/25/2024.        Review of Systems   Constitutional:  Negative for chills and fever.   Respiratory:  Positive for shortness of breath.    Cardiovascular:  Positive for chest pain.   Gastrointestinal:  Positive for nausea. Negative for abdominal pain, blood in stool, constipation, diarrhea and vomiting.   Genitourinary:  Negative for dysuria and hematuria.   Neurological:  Positive for light-headedness. Negative for dizziness and syncope.   Psychiatric/Behavioral:  Negative for agitation, behavioral problems and confusion.    All other systems reviewed and are negative.      No past medical history on file.    No Known Allergies    Past Surgical History:   Procedure Laterality Date    APPENDECTOMY  2002       Family History   Problem Relation Age of Onset    ALS Mother     Graves' disease Sister        Social History     Socioeconomic History    Marital status:    Tobacco Use    Smoking status: Never    Smokeless tobacco: Never   Vaping Use    Vaping status: Never Used   Substance and Sexual Activity    Alcohol use: Never    Drug use: Never    Sexual activity: Defer           Objective   Physical Exam  Constitutional:       Appearance: He is normal weight.   HENT:      Head: Normocephalic and atraumatic.      Mouth/Throat:       Mouth: Mucous membranes are moist.      Pharynx: Oropharynx is clear.   Eyes:      Extraocular Movements: Extraocular movements intact.      Pupils: Pupils are equal, round, and reactive to light.   Cardiovascular:      Rate and Rhythm: Regular rhythm. Tachycardia present.   Pulmonary:      Effort: Pulmonary effort is normal.      Breath sounds: Normal breath sounds.   Abdominal:      General: Abdomen is flat. There is no distension or abdominal bruit. There are no signs of injury.      Palpations: Abdomen is soft.      Tenderness: There is no abdominal tenderness. There is no guarding or rebound.   Skin:     General: Skin is warm and dry.   Neurological:      General: No focal deficit present.      Mental Status: He is alert and oriented to person, place, and time.      Motor: Weakness (Patient with a right upper and lower extremity weakness on exam) present.   Psychiatric:         Mood and Affect: Mood normal.         Behavior: Behavior normal.         Procedures           ED Course  ED Course as of 01/29/25 2133 Wed Jan 29, 2025   1652 ECG 12 Lead Chest Pain  Sinus tachycardia, bpm 106, no JOSE A, QTc 475 [EW]   1811 Protime-INR  Normal [EW]   1811 High Sensitivity Troponin T  Negative [EW]   1811 CBC & Differential(!)  Grossly within normal limits [EW]   1811 Comprehensive Metabolic Panel(!)  Glycemia 179, BUN of 23, bicarb of 20.9, anion gap of 17.1 [EW]   1950 Patient spoke with neurology.  Neurology is unsure of advantage patient will gain by being admitted to the hospital.  Believes patient may be treated outpatient.  Neurology request MRI of brain with and without contrast in the ED, states that he they will review MRI and provide further recommendations for disposition intervention. [EW]   2128 Neurology returns recommendations for intervention and disposition.  Neurology recommends no acute need for admission.  Patient should be started on Plavix and aspirin.  Neurology further recommends the patient not  have statin secondary to fatty liver disease.  States the patient can receive remaining workup outpatient.  Patient stable for discharge.  Patient given strict return precautions, appropriate follow-up appointments, recommendation for symptomatic care.  Will discharge patient. [EW]      ED Course User Index  [EW] Jose Alfredo Vincent MD                  HEART Score: 3   Shared Decision Making  I discussed the findings with the patient/patient representative who is in agreement with the treatment plan and the final disposition.  Risks and benefits of discharge and/or observation/admission were discussed: Yes                                      Medical Decision Making  59-year-old male with past medical history as above presents for evaluation of chest pain and neurologic deficit concerning for stroke.  Patient hemodynamically neurologically stable.  Vital signs significant for hypertension to 177/114.  Patient exam significant for tachycardic cardio exam, benign pulmonary and abdominal exam, patient with right sided strength deficits in upper and lower extremity, patient without sensory deficit.  Decision made to obtain labs and imaging for evaluation of patient's symptoms.  Results recorded in the ED course.  Anticipate admission of patient.    Amount and/or Complexity of Data Reviewed  Labs: ordered. Decision-making details documented in ED Course.  Radiology: ordered and independent interpretation performed. Decision-making details documented in ED Course.  ECG/medicine tests: ordered and independent interpretation performed. Decision-making details documented in ED Course.    Risk  OTC drugs.  Prescription drug management.        Final diagnoses:   Chest pain, unspecified type   Right sided weakness       ED Disposition  ED Disposition       ED Disposition   Discharge    Condition   Stable    Comment   --               Johana Leonard, APRN  [address] [facility] [address] [phone]          [facility]  [place] EMERGENCY DEPARTMENT  [address] [facility] [id number] [phone]        Fritz Carranza MD  [address]  [place] [id number]  [phone]               Medication List        New Prescriptions      aspirin 81 MG EC tablet  Take 1 tablet by mouth Daily for 30 days.     clopidogrel 75 MG tablet  Commonly known as: Plavix  Take 1 tablet by mouth Daily.               Where to Get Your Medications        These medications were sent to [facility] [address] [place] [address] - [phone]  - [phone] [place] [id number]      Phone: [phone]   aspirin 81 MG EC tablet  clopidogrel 75 MG tablet            Jose Alfredo Vincent MD  01/29/25 2700

## 2025-01-30 LAB
QT INTERVAL: 358 MS
QTC INTERVAL: 475 MS